# Patient Record
Sex: MALE | Race: WHITE | Employment: FULL TIME | URBAN - METROPOLITAN AREA
[De-identification: names, ages, dates, MRNs, and addresses within clinical notes are randomized per-mention and may not be internally consistent; named-entity substitution may affect disease eponyms.]

---

## 2019-08-14 ENCOUNTER — OFFICE VISIT - HEALTHEAST (OUTPATIENT)
Dept: FAMILY MEDICINE | Facility: CLINIC | Age: 44
End: 2019-08-14

## 2019-08-14 DIAGNOSIS — F42.9 OBSESSIVE-COMPULSIVE DISORDER, UNSPECIFIED TYPE: ICD-10-CM

## 2019-08-14 DIAGNOSIS — Z00.00 ROUTINE GENERAL MEDICAL EXAMINATION AT A HEALTH CARE FACILITY: ICD-10-CM

## 2019-08-14 DIAGNOSIS — N41.1 CHRONIC PROSTATITIS: ICD-10-CM

## 2019-08-14 DIAGNOSIS — R29.818 SUSPECTED SLEEP APNEA: ICD-10-CM

## 2019-08-14 LAB
ALBUMIN SERPL-MCNC: 4.3 G/DL (ref 3.5–5)
ALP SERPL-CCNC: 77 U/L (ref 45–120)
ALT SERPL W P-5'-P-CCNC: 31 U/L (ref 0–45)
ANION GAP SERPL CALCULATED.3IONS-SCNC: 9 MMOL/L (ref 5–18)
AST SERPL W P-5'-P-CCNC: 21 U/L (ref 0–40)
BILIRUB SERPL-MCNC: 0.6 MG/DL (ref 0–1)
BUN SERPL-MCNC: 17 MG/DL (ref 8–22)
CALCIUM SERPL-MCNC: 10.3 MG/DL (ref 8.5–10.5)
CHLORIDE BLD-SCNC: 106 MMOL/L (ref 98–107)
CHOLEST SERPL-MCNC: 154 MG/DL
CO2 SERPL-SCNC: 26 MMOL/L (ref 22–31)
CREAT SERPL-MCNC: 0.87 MG/DL (ref 0.7–1.3)
ERYTHROCYTE [DISTWIDTH] IN BLOOD BY AUTOMATED COUNT: 11.5 % (ref 11–14.5)
FASTING STATUS PATIENT QL REPORTED: YES
GFR SERPL CREATININE-BSD FRML MDRD: >60 ML/MIN/1.73M2
GLUCOSE BLD-MCNC: 95 MG/DL (ref 70–125)
HCT VFR BLD AUTO: 43.2 % (ref 40–54)
HDLC SERPL-MCNC: 36 MG/DL
HGB BLD-MCNC: 14.6 G/DL (ref 14–18)
LDLC SERPL CALC-MCNC: 104 MG/DL
MCH RBC QN AUTO: 30.7 PG (ref 27–34)
MCHC RBC AUTO-ENTMCNC: 33.8 G/DL (ref 32–36)
MCV RBC AUTO: 91 FL (ref 80–100)
PLATELET # BLD AUTO: 166 THOU/UL (ref 140–440)
PMV BLD AUTO: 9.2 FL (ref 7–10)
POTASSIUM BLD-SCNC: 4.8 MMOL/L (ref 3.5–5)
PROT SERPL-MCNC: 6.9 G/DL (ref 6–8)
RBC # BLD AUTO: 4.76 MILL/UL (ref 4.4–6.2)
SODIUM SERPL-SCNC: 141 MMOL/L (ref 136–145)
TRIGL SERPL-MCNC: 69 MG/DL
WBC: 5.1 THOU/UL (ref 4–11)

## 2019-08-14 ASSESSMENT — MIFFLIN-ST. JEOR: SCORE: 1615.87

## 2021-03-09 ENCOUNTER — IMMUNIZATION (OUTPATIENT)
Dept: PEDIATRICS | Facility: CLINIC | Age: 46
End: 2021-03-09
Payer: COMMERCIAL

## 2021-03-09 PROCEDURE — 91300 PR COVID VAC PFIZER DIL RECON 30 MCG/0.3 ML IM: CPT

## 2021-03-09 PROCEDURE — 0001A PR COVID VAC PFIZER DIL RECON 30 MCG/0.3 ML IM: CPT

## 2021-03-30 ENCOUNTER — IMMUNIZATION (OUTPATIENT)
Dept: PEDIATRICS | Facility: CLINIC | Age: 46
End: 2021-03-30
Attending: INTERNAL MEDICINE
Payer: COMMERCIAL

## 2021-03-30 PROCEDURE — 0002A PR COVID VAC PFIZER DIL RECON 30 MCG/0.3 ML IM: CPT

## 2021-03-30 PROCEDURE — 91300 PR COVID VAC PFIZER DIL RECON 30 MCG/0.3 ML IM: CPT

## 2021-04-11 ENCOUNTER — HEALTH MAINTENANCE LETTER (OUTPATIENT)
Age: 46
End: 2021-04-11

## 2021-05-31 NOTE — PROGRESS NOTES
"Subjective: Patient comes in for a physical.  I saw him back in 2013    This is a 43-year-old male he is a  in [place] he has 1 child 11 years old, male.    Patient states that his wife notices that he has some episodes where he is not breathing at night so can have him see sleep medicine to evaluate for sleep apnea.    He continues to see psychiatry he is on fluoxetine and clonazepam for OCD.    Past surgeries C2 fracture he had a halo had his wisdom teeth out and PE tubes    He had an elbow fracture when he was mountain biking 2018.    He is allergic to sulfa    Family history for hypertension and a brother also with alcoholism    His mom his macular degeneration and his dad is healthy.    He has a grandparent with prostate cancer.    No additional concerns or issues.  He states he does not smoke but does use marijuana and he does not drink alcohol.    No additional concerns or issues.    Tobacco status: He  reports that he has quit smoking. He has never used smokeless tobacco.    Patient Active Problem List    Diagnosis Date Noted     OCD (obsessive compulsive disorder) 11/09/2015     Chronic Bacterial Prostatitis      Arthralgia      Anxiety        Current Outpatient Medications   Medication Sig Dispense Refill     clonazePAM (KLONOPIN) 1 MG tablet Take 1 mg by mouth 2 (two) times a day as needed for anxiety.       FLUoxetine (PROZAC) 10 MG tablet Take 15 mg by mouth daily.       No current facility-administered medications for this visit.        ROS:   10 point review of systems positive as discussed above otherwise negative    Objective:    /60 (Patient Site: Right Arm, Patient Position: Sitting, Cuff Size: Adult Regular)   Pulse (!) 52   Temp 97.2  F (36.2  C) (Oral)   Resp 12   Ht 5' 10.5\" (1.791 m)   Wt 158 lb (71.7 kg)   BMI 22.35 kg/m    Body mass index is 22.35 kg/m .      General appearance no acute distress    HEENT canals and TMs were normal oropharynx is clear pupils " react normally extract movements full    Lungs are clear no rales or rhonchi heart was regular S1-S2.  No murmur    Abdomen soft nontender no guarding or rebound.    Skin was normal no rashes.    Joint without redness warmth or swelling.    Testes descended normally no evidence of hernia.    Lower extremities without edema.  Normal pulses in upper and lower extremities.    Labs  HDL 36 CMP normal CBC normal    Results for orders placed or performed in visit on 08/14/19   [facility] FAST[facility]   Result Value Ref Range    Cholesterol 154 <=199 mg/dL    Triglycerides 69 <=149 mg/dL    HDL Cholesterol 36 (L) >=40 mg/dL    LDL Calculated 104 <=129 mg/dL    Patient Fasting > 8hrs? Yes    Comprehensive Metabolic Panel   Result Value Ref Range    Sodium 141 136 - 145 mmol/L    Potassium 4.8 3.5 - 5.0 mmol/L    Chloride 106 98 - 107 mmol/L    CO2 26 22 - 31 mmol/L    Anion Gap, Calculation 9 5 - 18 mmol/L    Glucose 95 70 - 125 mg/dL    BUN 17 8 - 22 mg/dL    Creatinine 0.87 0.70 - 1.30 mg/dL    GFR MDRD Af Amer >60 >60 mL/min/1.73m2    GFR MDRD Non Af Amer >60 >60 mL/min/1.73m2    Bilirubin, Total 0.6 0.0 - 1.0 mg/dL    Calcium 10.3 8.5 - 10.5 mg/dL    Protein, Total 6.9 6.0 - 8.0 g/dL    Albumin 4.3 3.5 - 5.0 g/dL    Alkaline Phosphatase 77 45 - 120 U/L    AST 21 0 - 40 U/L    ALT 31 0 - 45 U/L   HM2(CBC w/o Differential)   Result Value Ref Range    WBC 5.1 4.0 - 11.0 thou/uL    RBC 4.76 4.40 - 6.20 mill/uL    Hemoglobin 14.6 14.0 - 18.0 g/dL    Hematocrit 43.2 40.0 - 54.0 %    MCV 91 80 - 100 fL    MCH 30.7 27.0 - 34.0 pg    MCHC 33.8 32.0 - 36.0 g/dL    RDW 11.5 11.0 - 14.5 %    Platelets 166 140 - 440 thou/uL    MPV 9.2 7.0 - 10.0 fL       Assessment:  1. Routine general medical examination at a health care facility  [facility] FAST[facility]    Comprehensive Metabolic Panel    HM2(CBC w/o Differential)   2. Obsessive-compulsive disorder, unspecified type     3. Chronic Bacterial Prostatitis     4. Suspected sleep  apnea  Ambulatory referral to Sleep Medicine     Physical stable    OCD continue same meds continue to see psychiatry    Low HDL increase physical activity    History of some prostatitis in the past no evidence known    Sleep apnea suspect, referral to sleep medicine  Plan: Patient was contacted with the results follow-up in 1 year    This transcription uses voice recognition software, which may contain typographical errors.

## 2021-06-03 VITALS — WEIGHT: 158 LBS | BODY MASS INDEX: 22.12 KG/M2 | HEIGHT: 71 IN

## 2021-09-26 ENCOUNTER — HEALTH MAINTENANCE LETTER (OUTPATIENT)
Age: 46
End: 2021-09-26

## 2022-05-07 ENCOUNTER — HEALTH MAINTENANCE LETTER (OUTPATIENT)
Age: 47
End: 2022-05-07

## 2023-04-23 ENCOUNTER — HEALTH MAINTENANCE LETTER (OUTPATIENT)
Age: 48
End: 2023-04-23

## 2023-06-02 ENCOUNTER — HEALTH MAINTENANCE LETTER (OUTPATIENT)
Age: 48
End: 2023-06-02